# Patient Record
Sex: FEMALE | Race: WHITE | Employment: FULL TIME | URBAN - METROPOLITAN AREA
[De-identification: names, ages, dates, MRNs, and addresses within clinical notes are randomized per-mention and may not be internally consistent; named-entity substitution may affect disease eponyms.]

---

## 2024-10-21 ENCOUNTER — OFFICE VISIT (OUTPATIENT)
Age: 39
End: 2024-10-21

## 2024-10-21 VITALS
RESPIRATION RATE: 18 BRPM | HEIGHT: 67 IN | HEART RATE: 89 BPM | SYSTOLIC BLOOD PRESSURE: 129 MMHG | DIASTOLIC BLOOD PRESSURE: 76 MMHG | TEMPERATURE: 97.9 F | BODY MASS INDEX: 31.39 KG/M2 | WEIGHT: 200 LBS | OXYGEN SATURATION: 96 %

## 2024-10-21 DIAGNOSIS — N30.01 ACUTE CYSTITIS WITH HEMATURIA: Primary | ICD-10-CM

## 2024-10-21 LAB
BILIRUBIN, POC: NEGATIVE
BLOOD URINE, POC: ABNORMAL
CLARITY, POC: ABNORMAL
COLOR, POC: YELLOW
GLUCOSE URINE, POC: NEGATIVE MG/DL
KETONES, POC: NEGATIVE MG/DL
LEUKOCYTE EST, POC: ABNORMAL
NITRITE, POC: NEGATIVE
PH, POC: 6
PROTEIN, POC: NEGATIVE MG/DL
SPECIFIC GRAVITY, POC: 1.01
UROBILINOGEN, POC: NEGATIVE MG/DL

## 2024-10-21 RX ORDER — PHENAZOPYRIDINE HYDROCHLORIDE 200 MG/1
200 TABLET, FILM COATED ORAL 3 TIMES DAILY PRN
Qty: 6 TABLET | Refills: 0 | Status: SHIPPED | OUTPATIENT
Start: 2024-10-21 | End: 2024-10-24

## 2024-10-21 RX ORDER — NITROFURANTOIN 25; 75 MG/1; MG/1
100 CAPSULE ORAL 2 TIMES DAILY
Qty: 10 CAPSULE | Refills: 0 | Status: SHIPPED | OUTPATIENT
Start: 2024-10-21 | End: 2024-10-26

## 2024-10-21 NOTE — PROGRESS NOTES
Kirsten Steven (:  1985) is a 39 y.o. female,New patient, here for evaluation of the following chief complaint(s):  Dysuria (Sxs started two days ago.)      1. Acute cystitis with hematuria  -     nitrofurantoin, macrocrystal-monohydrate, (MACROBID) 100 MG capsule; Take 1 capsule by mouth 2 times daily for 5 days, Disp-10 capsule, R-0Normal  -     phenazopyridine (PYRIDIUM) 200 MG tablet; Take 1 tablet by mouth 3 times daily as needed for Pain, Disp-6 tablet, R-0Normal     Results for orders placed or performed in visit on 10/21/24   POCT Urinalysis no Micro   Result Value Ref Range    Color, UA Yellow     Clarity, UA Slightly Cloudy (A)     Glucose, UA POC Negative mg/dL    Bilirubin, UA Negative     Ketones, UA Negative mg/dL    Spec Grav, UA 1.015     Blood, UA POC Large (A)     pH, UA 6.0     Protein, UA POC Negative mg/dL    Urobilinogen, UA Negative mg/dL    Leukocytes, UA Trace (A)     Nitrite, UA Negative       Clinical exam consistent with UTI  Macrobid  Pyridium  You will be contacted with the results of the urine culture if your antibiotic needs to be changed  Drink plenty of fluids.     Patient verbalized understanding of printed and verbal discharge instructions including follow up care.       Follow up in 7 days if symptoms persist or if symptoms worsen.       Subjective :  Complained of dysuria, frequency, urgency      History provided by:  Patient  Dysuria   This is a new problem. The current episode started in the past 7 days. The problem occurs every urination. The quality of the pain is described as burning. Associated symptoms include frequency, hematuria and urgency. Pertinent negatives include no chills. She has tried increased fluids for the symptoms. The treatment provided no relief.     HPI:   39 y.o. female presents with symptoms of   Urinary Tract Infection  Patient complains of dysuria, frequency, urgency She has had symptoms for 2 days. Patient also complains of  nothing .

## 2024-10-23 LAB
BACTERIA UR CULT: ABNORMAL
ORGANISM: ABNORMAL